# Patient Record
Sex: FEMALE
[De-identification: names, ages, dates, MRNs, and addresses within clinical notes are randomized per-mention and may not be internally consistent; named-entity substitution may affect disease eponyms.]

---

## 2023-08-01 PROBLEM — Z00.00 ENCOUNTER FOR PREVENTIVE HEALTH EXAMINATION: Status: ACTIVE | Noted: 2023-08-01

## 2023-09-05 ENCOUNTER — APPOINTMENT (OUTPATIENT)
Dept: CARDIOLOGY | Facility: CLINIC | Age: 58
End: 2023-09-05
Payer: MEDICARE

## 2023-09-05 VITALS — WEIGHT: 155 LBS | SYSTOLIC BLOOD PRESSURE: 146 MMHG | HEART RATE: 88 BPM | DIASTOLIC BLOOD PRESSURE: 90 MMHG

## 2023-09-05 DIAGNOSIS — Z78.9 OTHER SPECIFIED HEALTH STATUS: ICD-10-CM

## 2023-09-05 PROCEDURE — 93000 ELECTROCARDIOGRAM COMPLETE: CPT

## 2023-09-05 PROCEDURE — 99204 OFFICE O/P NEW MOD 45 MIN: CPT

## 2023-09-05 RX ORDER — ACETYLCYSTEINE 600 MG
CAPSULE ORAL AT BEDTIME
Refills: 0 | Status: ACTIVE | COMMUNITY

## 2023-09-05 RX ORDER — METOPROLOL SUCCINATE 50 MG/1
50 TABLET, EXTENDED RELEASE ORAL DAILY
Qty: 90 | Refills: 3 | Status: ACTIVE | COMMUNITY

## 2023-09-05 RX ORDER — AMLODIPINE BESYLATE 2.5 MG/1
2.5 TABLET ORAL DAILY
Qty: 90 | Refills: 3 | Status: ACTIVE | COMMUNITY

## 2023-09-05 RX ORDER — PHENOL 1.4 %
600-400 AEROSOL, SPRAY (ML) MUCOUS MEMBRANE TWICE DAILY
Refills: 0 | Status: ACTIVE | COMMUNITY

## 2023-09-05 RX ORDER — DOCUSATE SODIUM 100 MG
100 TABLET ORAL AT BEDTIME
Refills: 0 | Status: ACTIVE | COMMUNITY

## 2023-09-05 RX ORDER — CARBAMAZEPINE 200 MG/1
200 TABLET ORAL TWICE DAILY
Refills: 0 | Status: ACTIVE | COMMUNITY

## 2023-09-05 RX ORDER — BUSPIRONE HYDROCHLORIDE 10 MG/1
10 TABLET ORAL 3 TIMES DAILY
Refills: 0 | Status: ACTIVE | COMMUNITY

## 2023-09-05 NOTE — HISTORY OF PRESENT ILLNESS
[FreeTextEntry1] : Referred from [facility]. Patient is unable to provide any history.  58 F with HTN. No cardiac history documented.  Denies any CP, SOB, palpitations, orthopnea/PND, dizziness or syncope. Repeat /84.  ROS:  Unable to obtain   EKG (9/5/2023):  NSR, LVH, no ST-T changes

## 2023-09-05 NOTE — DISCUSSION/SUMMARY
[FreeTextEntry1] : TTE to assess for structural heart disease If BP remains elevated, increase Amlodipine to 5 mg daily Continue Metoprolol succinate 50 mg daily Follow up 3 months

## 2023-10-23 ENCOUNTER — APPOINTMENT (OUTPATIENT)
Dept: CARDIOLOGY | Facility: CLINIC | Age: 58
End: 2023-10-23
Payer: MEDICARE

## 2023-10-23 PROCEDURE — 93306 TTE W/DOPPLER COMPLETE: CPT

## 2023-11-07 ENCOUNTER — APPOINTMENT (OUTPATIENT)
Dept: CARDIOLOGY | Facility: CLINIC | Age: 58
End: 2023-11-07
Payer: MEDICARE

## 2023-11-07 VITALS
HEIGHT: 65 IN | WEIGHT: 155 LBS | BODY MASS INDEX: 25.83 KG/M2 | SYSTOLIC BLOOD PRESSURE: 150 MMHG | HEART RATE: 74 BPM | DIASTOLIC BLOOD PRESSURE: 90 MMHG

## 2023-11-07 DIAGNOSIS — I10 ESSENTIAL (PRIMARY) HYPERTENSION: ICD-10-CM

## 2023-11-07 DIAGNOSIS — Z13.6 ENCOUNTER FOR SCREENING FOR CARDIOVASCULAR DISORDERS: ICD-10-CM

## 2023-11-07 PROCEDURE — 99213 OFFICE O/P EST LOW 20 MIN: CPT

## 2023-12-22 ENCOUNTER — APPOINTMENT (OUTPATIENT)
Dept: OTOLARYNGOLOGY | Facility: CLINIC | Age: 58
End: 2023-12-22
Payer: MEDICARE

## 2023-12-22 DIAGNOSIS — H61.23 IMPACTED CERUMEN, BILATERAL: ICD-10-CM

## 2023-12-22 PROCEDURE — 99203 OFFICE O/P NEW LOW 30 MIN: CPT

## 2023-12-26 NOTE — HISTORY OF PRESENT ILLNESS
[de-identified] : Patient presents today for c/o wax build up. She is in a home and her accompanying aide states she is just here to get built up cerumen removed. No further complaints.

## 2023-12-26 NOTE — PHYSICAL EXAM
[FreeTextEntry1] : Intermittently verbal, requiring re-directioning.  [de-identified] : Cerumen impaction bilaterally, unable to tolerate removal [de-identified] : Unable to visualize [Midline] : trachea located in midline position [Normal] : palpation of lymph nodes is normal

## 2023-12-26 NOTE — ASSESSMENT
[FreeTextEntry1] : Renata is a pleasant 57 yo female with bilateral cerumen impaction and unable to tolerate removal today.   Recommend debrox drops, 5  drops BID for 2 weeks  Follow up in 2 weeks.   Mo Bell MD, MPH  [facility]

## 2024-07-30 ENCOUNTER — APPOINTMENT (OUTPATIENT)
Dept: OTOLARYNGOLOGY | Facility: CLINIC | Age: 59
End: 2024-07-30
Payer: MEDICARE

## 2024-07-30 DIAGNOSIS — H61.23 IMPACTED CERUMEN, BILATERAL: ICD-10-CM

## 2024-07-30 DIAGNOSIS — H93.8X3 OTHER SPECIFIED DISORDERS OF EAR, BILATERAL: ICD-10-CM

## 2024-07-30 PROCEDURE — 99214 OFFICE O/P EST MOD 30 MIN: CPT

## 2024-07-30 RX ORDER — ASPIRIN 81 MG
6.5 TABLET, DELAYED RELEASE (ENTERIC COATED) ORAL
Qty: 1 | Refills: 5 | Status: ACTIVE | COMMUNITY
Start: 2024-07-30 | End: 1900-01-01

## 2024-07-30 NOTE — HISTORY OF PRESENT ILLNESS
[FreeTextEntry1] : Patient returns today c/o clogged ears. Accompanied by aide. Here for ear evaluation.  Denies otalgia, tinnitus, vertigo, recent ear infections.

## 2024-07-30 NOTE — ASSESSMENT
[FreeTextEntry1] : Wax removed from right ear. Patient adamant to cleaning of left ear. Debrox ordered.  RTC in 2 weeks.

## 2024-07-30 NOTE — PHYSICAL EXAM
[Normal] :  tongue is normal [Midline] : trachea located in midline position [FreeTextEntry1] : Intermittently verbal, requiring re-directioning.  [de-identified] : Cerumen impaction bilaterally, unable to tolerate removal [de-identified] : Unable to visualize

## 2024-07-30 NOTE — PHYSICAL EXAM
[Normal] :  tongue is normal [Midline] : trachea located in midline position [FreeTextEntry1] : Intermittently verbal, requiring re-directioning.  [de-identified] : Cerumen impaction bilaterally, unable to tolerate removal [de-identified] : Unable to visualize

## 2024-08-30 ENCOUNTER — APPOINTMENT (OUTPATIENT)
Dept: OTOLARYNGOLOGY | Facility: CLINIC | Age: 59
End: 2024-08-30
Payer: MEDICARE

## 2024-08-30 DIAGNOSIS — H61.23 IMPACTED CERUMEN, BILATERAL: ICD-10-CM

## 2024-08-30 DIAGNOSIS — H93.8X3 OTHER SPECIFIED DISORDERS OF EAR, BILATERAL: ICD-10-CM

## 2024-08-30 PROCEDURE — 99214 OFFICE O/P EST MOD 30 MIN: CPT

## 2024-08-30 NOTE — PHYSICAL EXAM
[Normal] :  tongue is normal [Midline] : trachea located in midline position [FreeTextEntry1] : Intermittently verbal, requiring re-directioning.  [de-identified] : Cerumen impaction bilaterally, unable to tolerate removal [de-identified] : Unable to visualize

## 2024-08-30 NOTE — PHYSICAL EXAM
[Normal] :  tongue is normal [Midline] : trachea located in midline position [FreeTextEntry1] : Intermittently verbal, requiring re-directioning.  [de-identified] : Cerumen impaction bilaterally, unable to tolerate removal [de-identified] : Unable to visualize

## 2024-08-30 NOTE — HISTORY OF PRESENT ILLNESS
[FreeTextEntry1] : Patient returns today c/o clogged ears. Accompanied by aide. Here today for ear evaluation.   No otalgia, tinnitus, vertigo, known recent ear infections.

## 2024-08-30 NOTE — ASSESSMENT
[FreeTextEntry1] : Cerumen impaction bilaterally.  Patient unable to tolerate removal today.  Recommend debrox drops, 5 drops BID to both ears for 2 weeks.  Follow up in 2 weeks to re-attempt removal.

## 2024-11-26 ENCOUNTER — APPOINTMENT (OUTPATIENT)
Dept: OTOLARYNGOLOGY | Facility: CLINIC | Age: 59
End: 2024-11-26
Payer: MEDICARE

## 2024-11-26 DIAGNOSIS — H61.22 IMPACTED CERUMEN, LEFT EAR: ICD-10-CM

## 2024-11-26 DIAGNOSIS — H93.8X3 OTHER SPECIFIED DISORDERS OF EAR, BILATERAL: ICD-10-CM

## 2024-11-26 PROCEDURE — 99213 OFFICE O/P EST LOW 20 MIN: CPT

## 2024-12-10 ENCOUNTER — APPOINTMENT (OUTPATIENT)
Dept: OTOLARYNGOLOGY | Facility: CLINIC | Age: 59
End: 2024-12-10

## 2024-12-26 ENCOUNTER — APPOINTMENT (OUTPATIENT)
Dept: CARDIOLOGY | Facility: CLINIC | Age: 59
End: 2024-12-26

## 2025-02-28 ENCOUNTER — APPOINTMENT (OUTPATIENT)
Dept: OTOLARYNGOLOGY | Facility: CLINIC | Age: 60
End: 2025-02-28

## 2025-06-27 ENCOUNTER — APPOINTMENT (OUTPATIENT)
Dept: OTOLARYNGOLOGY | Facility: CLINIC | Age: 60
End: 2025-06-27
Payer: MEDICARE

## 2025-06-27 VITALS — BODY MASS INDEX: 25.83 KG/M2 | WEIGHT: 155 LBS | HEIGHT: 65 IN

## 2025-06-27 PROCEDURE — 99214 OFFICE O/P EST MOD 30 MIN: CPT
